# Patient Record
Sex: FEMALE | Race: BLACK OR AFRICAN AMERICAN | NOT HISPANIC OR LATINO | Employment: OTHER | URBAN - NONMETROPOLITAN AREA
[De-identification: names, ages, dates, MRNs, and addresses within clinical notes are randomized per-mention and may not be internally consistent; named-entity substitution may affect disease eponyms.]

---

## 2022-06-22 ENCOUNTER — OFFICE VISIT (OUTPATIENT)
Dept: OBSTETRICS AND GYNECOLOGY | Facility: CLINIC | Age: 62
End: 2022-06-22

## 2022-06-22 VITALS
WEIGHT: 133.2 LBS | DIASTOLIC BLOOD PRESSURE: 72 MMHG | HEIGHT: 60 IN | SYSTOLIC BLOOD PRESSURE: 118 MMHG | BODY MASS INDEX: 26.15 KG/M2

## 2022-06-22 DIAGNOSIS — L29.2 VULVAR ITCHING: Primary | ICD-10-CM

## 2022-06-22 DIAGNOSIS — N95.8 GENITOURINARY SYNDROME OF MENOPAUSE: ICD-10-CM

## 2022-06-22 PROCEDURE — 99202 OFFICE O/P NEW SF 15 MIN: CPT | Performed by: OBSTETRICS & GYNECOLOGY

## 2022-06-22 NOTE — PROGRESS NOTES
Yuki Maradiaga is a 62 y.o. y/o female.     Chief Complaint: Vulvar itching    HPI:   62 y.o. No obstetric history on file..  No LMP recorded. Patient has had a hysterectomy..  Patient complains of vulvar itching for about a year.  She had had a hysterectomy at [facility] in 2009 for what sounds like abnormal uterine bleeding.  By history both tubes and ovaries were removed.  Never on hormonal replacement          Review of Systems     Constitutional: Denies night sweats    HENT: No hearing changes, denies ear pain    Eye: No eye pain; no foreign body in eye    Pulmonary: No hemoptysis    Cardiovascular: No claudication    GI: No hematemesis    Musculoskeletal: No arthralgias, no joint swelling    Endocrine: No polydipsia or polyuria    Hematologic: Denies any free bleeding    Psychiatric: Denies any delusions    The following portions of the patient's history were reviewed and updated as appropriate: allergies, current medications, past family history, past medical history, past social history, past surgical history and problem list.    No Known Allergies     Outpatient Medications Prior to Visit   Medication Sig Dispense Refill   • Ascorbic Acid (VITAMIN C PO) Take 1,000 mg by mouth Daily.     • ELDERBERRY PO Take  by mouth.       No facility-administered medications prior to visit.        The patient has a family history of   History reviewed. No pertinent family history.     Past Medical History:   Diagnosis Date   • Disease of thyroid gland    • History of transfusion    • Recurrent pregnancy loss, antepartum condition or complication         OB History    No obstetric history on file.          Social History     Socioeconomic History   • Marital status:    Tobacco Use   • Smoking status: Never Smoker   • Smokeless tobacco: Never Used   Substance and Sexual Activity   • Alcohol use: Never   • Drug use: Never        Past Surgical History:   Procedure Laterality Date   • ABDOMINAL HYSTERECTOMY     • BREAST  "LUMPECTOMY     •  SECTION     • THYROIDECTOMY, PARTIAL          There is no problem list on file for this patient.       Documented Vitals    22 1004   BP: 118/72   Weight: 60.4 kg (133 lb 3.2 oz)   Height: 152.4 cm (60\")   PainSc: 0-No pain        Body mass index is 26.01 kg/m².    Physical Exam  Constitutional: Appears to be in no acute distress; Eyes: Sclerae normal; Endocrine system: Thyroid palpation is normal; Pulmonary system: Lungs clear; Cardiovascular system: Heart regular rate and rhythm; Gastrointestinal system: Abdomen soft and nontender, active bowel sounds; Urologic system: CVA negative; Psychiatric: Appropriate insight; Neurologic: Gait within normal limits female genital system external genitalia normal there is no lesions nothing really to biopsy or needs biopsy there is some generalized dryness consistent with genitourinary syndrome    Laboratory Data:   No results for input(s): GLUCOSE, BUN, CREATININE, NA, K, CL, CO2, CALCIUM, PROTEINTOT, ALBUMIN, ALT, AST, ALKPHOS, BILITOT, EGFRIFNONA, GLOB, AGRATIO, BCR, ANIONGAP, BILIDIR, INDBILI in the last 96797 hours.  No results for input(s): WBC, RBC, HGB, HCT, MCV, MCH, MCHC, RDW, RDWSD, MPV, PLT in the last 73738 hours.  No results for input(s): HCGQUAL in the last 99633 hours.    Assessment        Diagnosis Plan   1. Vulvar itching     2. Genitourinary syndrome of menopause     Working to give her some samples as she is self-pay of estrogen cream and see back in about 3 months to only use very sparingly on external genitalia twice a week    Plan       No orders of the defined types were placed in this encounter.            This document has been electronically signed by Clayton Blanton MD on 2022 14:39 CDT    Please note that portions of this note were completed with a voice recognition program.           "